# Patient Record
Sex: FEMALE | Race: WHITE | NOT HISPANIC OR LATINO | Employment: FULL TIME | URBAN - METROPOLITAN AREA
[De-identification: names, ages, dates, MRNs, and addresses within clinical notes are randomized per-mention and may not be internally consistent; named-entity substitution may affect disease eponyms.]

---

## 2019-10-08 ENCOUNTER — LAB (OUTPATIENT)
Dept: FAMILY MEDICINE CLINIC | Facility: CLINIC | Age: 49
End: 2019-10-08

## 2019-10-08 ENCOUNTER — TELEPHONE (OUTPATIENT)
Dept: FAMILY MEDICINE CLINIC | Facility: CLINIC | Age: 49
End: 2019-10-08

## 2019-10-08 ENCOUNTER — OFFICE VISIT (OUTPATIENT)
Dept: FAMILY MEDICINE CLINIC | Facility: CLINIC | Age: 49
End: 2019-10-08

## 2019-10-08 VITALS
WEIGHT: 267 LBS | HEIGHT: 65 IN | DIASTOLIC BLOOD PRESSURE: 86 MMHG | OXYGEN SATURATION: 98 % | BODY MASS INDEX: 44.48 KG/M2 | HEART RATE: 71 BPM | SYSTOLIC BLOOD PRESSURE: 142 MMHG | RESPIRATION RATE: 12 BRPM | TEMPERATURE: 98.2 F

## 2019-10-08 DIAGNOSIS — Z12.39 SCREENING FOR BREAST CANCER: ICD-10-CM

## 2019-10-08 DIAGNOSIS — Z00.00 PREVENTATIVE HEALTH CARE: ICD-10-CM

## 2019-10-08 DIAGNOSIS — E55.9 VITAMIN D DEFICIENCY: ICD-10-CM

## 2019-10-08 DIAGNOSIS — E66.01 CLASS 3 SEVERE OBESITY WITH BODY MASS INDEX (BMI) OF 40.0 TO 44.9 IN ADULT, UNSPECIFIED OBESITY TYPE, UNSPECIFIED WHETHER SERIOUS COMORBIDITY PRESENT (HCC): ICD-10-CM

## 2019-10-08 DIAGNOSIS — Z00.00 PREVENTATIVE HEALTH CARE: Primary | ICD-10-CM

## 2019-10-08 DIAGNOSIS — R35.0 URINARY FREQUENCY: ICD-10-CM

## 2019-10-08 LAB
ALBUMIN SERPL-MCNC: 3.5 G/DL (ref 3.5–4.8)
ALBUMIN/GLOB SERPL: 1 G/DL (ref 1–1.7)
ALP SERPL-CCNC: 54 U/L (ref 32–91)
ALT SERPL W P-5'-P-CCNC: 30 U/L (ref 14–54)
ANION GAP SERPL CALCULATED.3IONS-SCNC: 11.9 MMOL/L (ref 5–15)
ARTICHOKE IGE QN: 126 MG/DL (ref 0–100)
AST SERPL-CCNC: 25 U/L (ref 15–41)
BACTERIA UR QL AUTO: ABNORMAL /HPF
BASOPHILS # BLD AUTO: 0.1 10*3/MM3 (ref 0–0.2)
BASOPHILS NFR BLD AUTO: 1 % (ref 0–1.5)
BILIRUB SERPL-MCNC: 0.8 MG/DL (ref 0.3–1.2)
BILIRUB UR QL STRIP: NEGATIVE
BUN BLD-MCNC: 14 MG/DL (ref 8–20)
BUN/CREAT SERPL: 17.5 (ref 5.4–26.2)
CALCIUM SPEC-SCNC: 8.7 MG/DL (ref 8.9–10.3)
CHLORIDE SERPL-SCNC: 106 MMOL/L (ref 101–111)
CHOLEST SERPL-MCNC: 165 MG/DL
CLARITY UR: ABNORMAL
CO2 SERPL-SCNC: 27 MMOL/L (ref 22–32)
COLOR UR: YELLOW
CREAT BLD-MCNC: 0.8 MG/DL (ref 0.4–1)
DEPRECATED RDW RBC AUTO: 43.3 FL (ref 37–54)
EOSINOPHIL # BLD AUTO: 0.2 10*3/MM3 (ref 0–0.4)
EOSINOPHIL NFR BLD AUTO: 2.2 % (ref 0.3–6.2)
ERYTHROCYTE [DISTWIDTH] IN BLOOD BY AUTOMATED COUNT: 14.4 % (ref 12.3–15.4)
GFR SERPL CREATININE-BSD FRML MDRD: 77 ML/MIN/1.73
GLOBULIN UR ELPH-MCNC: 3.5 GM/DL (ref 2.5–3.8)
GLUCOSE BLD-MCNC: 87 MG/DL (ref 65–99)
GLUCOSE UR STRIP-MCNC: NEGATIVE MG/DL
HCT VFR BLD AUTO: 36.1 % (ref 34–46.6)
HDLC SERPL QL: 3.93
HDLC SERPL-MCNC: 42 MG/DL
HGB BLD-MCNC: 12.3 G/DL (ref 12–15.9)
HGB UR QL STRIP.AUTO: ABNORMAL
HYALINE CASTS UR QL AUTO: ABNORMAL /LPF
KETONES UR QL STRIP: NEGATIVE
LDLC/HDLC SERPL: 2.64 {RATIO}
LEUKOCYTE ESTERASE UR QL STRIP.AUTO: ABNORMAL
LYMPHOCYTES # BLD AUTO: 2.1 10*3/MM3 (ref 0.7–3.1)
LYMPHOCYTES NFR BLD AUTO: 28.2 % (ref 19.6–45.3)
MCH RBC QN AUTO: 29 PG (ref 26.6–33)
MCHC RBC AUTO-ENTMCNC: 34 G/DL (ref 31.5–35.7)
MCV RBC AUTO: 85.4 FL (ref 79–97)
MONOCYTES # BLD AUTO: 0.6 10*3/MM3 (ref 0.1–0.9)
MONOCYTES NFR BLD AUTO: 7.8 % (ref 5–12)
NEUTROPHILS # BLD AUTO: 4.5 10*3/MM3 (ref 1.7–7)
NEUTROPHILS NFR BLD AUTO: 60.8 % (ref 42.7–76)
NITRITE UR QL STRIP: NEGATIVE
NRBC BLD AUTO-RTO: 0.2 /100 WBC (ref 0–0.2)
PH UR STRIP.AUTO: 7.5 [PH] (ref 5–8)
PLAT MORPH BLD: NORMAL
PLATELET # BLD AUTO: 369 10*3/MM3 (ref 140–450)
PMV BLD AUTO: 8.3 FL (ref 6–12)
POTASSIUM BLD-SCNC: 3.9 MMOL/L (ref 3.6–5.1)
PROT SERPL-MCNC: 7 G/DL (ref 6.1–7.9)
PROT UR QL STRIP: NEGATIVE
RBC # BLD AUTO: 4.23 10*6/MM3 (ref 3.77–5.28)
RBC # UR: ABNORMAL /HPF
RBC MORPH BLD: NORMAL
REF LAB TEST METHOD: ABNORMAL
SODIUM BLD-SCNC: 141 MMOL/L (ref 136–144)
SP GR UR STRIP: 1.02 (ref 1–1.03)
SQUAMOUS #/AREA URNS HPF: ABNORMAL /HPF
TRIGL SERPL-MCNC: 61 MG/DL
TSH SERPL DL<=0.05 MIU/L-ACNC: 1.06 UIU/ML (ref 0.34–5.6)
UROBILINOGEN UR QL STRIP: ABNORMAL
VLDLC SERPL-MCNC: 12.2 MG/DL
WBC MORPH BLD: NORMAL
WBC NRBC COR # BLD: 7.3 10*3/MM3 (ref 3.4–10.8)
WBC UR QL AUTO: ABNORMAL /HPF

## 2019-10-08 PROCEDURE — 80061 LIPID PANEL: CPT | Performed by: INTERNAL MEDICINE

## 2019-10-08 PROCEDURE — 87086 URINE CULTURE/COLONY COUNT: CPT | Performed by: INTERNAL MEDICINE

## 2019-10-08 PROCEDURE — 84443 ASSAY THYROID STIM HORMONE: CPT | Performed by: INTERNAL MEDICINE

## 2019-10-08 PROCEDURE — 80053 COMPREHEN METABOLIC PANEL: CPT | Performed by: INTERNAL MEDICINE

## 2019-10-08 PROCEDURE — 83036 HEMOGLOBIN GLYCOSYLATED A1C: CPT | Performed by: INTERNAL MEDICINE

## 2019-10-08 PROCEDURE — 36415 COLL VENOUS BLD VENIPUNCTURE: CPT

## 2019-10-08 PROCEDURE — 99386 PREV VISIT NEW AGE 40-64: CPT | Performed by: INTERNAL MEDICINE

## 2019-10-08 PROCEDURE — 85007 BL SMEAR W/DIFF WBC COUNT: CPT | Performed by: INTERNAL MEDICINE

## 2019-10-08 PROCEDURE — 81001 URINALYSIS AUTO W/SCOPE: CPT | Performed by: INTERNAL MEDICINE

## 2019-10-08 PROCEDURE — 85025 COMPLETE CBC W/AUTO DIFF WBC: CPT | Performed by: INTERNAL MEDICINE

## 2019-10-08 PROCEDURE — 82306 VITAMIN D 25 HYDROXY: CPT | Performed by: INTERNAL MEDICINE

## 2019-10-08 RX ORDER — NAPROXEN SODIUM 220 MG
220 TABLET ORAL 2 TIMES DAILY PRN
COMMUNITY
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
## 2019-10-08 NOTE — TELEPHONE ENCOUNTER
Please write letter addressed to [facility] bariatric surgery program in support of pt receiving gastric bypass, having failed conservative measures. Pt had actually gone through the program before, but surgery was a plan exclusion. Now that pt has changed insurances, is ready to undergo surgery again.    Please email letter to:  [email or web address]

## 2019-10-09 LAB
25(OH)D3 SERPL-MCNC: 29.4 NG/ML (ref 30–100)
BACTERIA SPEC AEROBE CULT: NORMAL
HBA1C MFR BLD: 5.5 % (ref 3.5–5.6)

## 2019-10-31 ENCOUNTER — APPOINTMENT (OUTPATIENT)
Dept: MAMMOGRAPHY | Facility: HOSPITAL | Age: 49
End: 2019-10-31

## 2019-11-05 ENCOUNTER — TELEPHONE (OUTPATIENT)
Dept: FAMILY MEDICINE CLINIC | Facility: CLINIC | Age: 49
End: 2019-11-05

## 2019-11-05 DIAGNOSIS — R53.83 FATIGUE, UNSPECIFIED TYPE: Primary | ICD-10-CM

## 2019-11-05 NOTE — TELEPHONE ENCOUNTER
We're not allowed to order sleep studies directly anymore - have to refer to sleep medicine    cruzito Torres to put in referral to sleep med

## 2019-11-05 NOTE — TELEPHONE ENCOUNTER
Patient left  stating that she went to [facility] initial assessment today. Patient states that she was told by Dr. Li that she needs to have a sleep study done, and to contact PCP for order. Patient is asking if this can be set up.

## 2019-11-06 ENCOUNTER — HOSPITAL ENCOUNTER (OUTPATIENT)
Dept: MAMMOGRAPHY | Facility: HOSPITAL | Age: 49
Discharge: HOME | End: 2019-11-06
Admitting: INTERNAL MEDICINE

## 2019-11-06 DIAGNOSIS — Z12.39 SCREENING FOR BREAST CANCER: ICD-10-CM

## 2019-11-06 PROCEDURE — 77067 SCR MAMMO BI INCL CAD: CPT

## 2019-11-06 PROCEDURE — 77063 BREAST TOMOSYNTHESIS BI: CPT

## 2019-11-18 ENCOUNTER — TELEPHONE (OUTPATIENT)
Dept: FAMILY MEDICINE CLINIC | Facility: CLINIC | Age: 49
End: 2019-11-18

## 2019-11-18 NOTE — TELEPHONE ENCOUNTER
Patient called checking on status of paperwork that was dropped off last week. Patient states that paperwork has to be faxed to [facility] weight management by 11/26. Patient states that she has to get a scope done.

## 2020-02-20 ENCOUNTER — TELEPHONE (OUTPATIENT)
Dept: FAMILY MEDICINE CLINIC | Facility: CLINIC | Age: 50
End: 2020-02-20

## 2020-02-20 NOTE — TELEPHONE ENCOUNTER
Spoke with patient. She had her follow up with surgeon already and states she is sore but healing fine. Supposed to return to work on 3/9/20 and see him again in April.

## 2024-07-09 ENCOUNTER — OFFICE (AMBULATORY)
Dept: URBAN - METROPOLITAN AREA PATHOLOGY 19 | Facility: PATHOLOGY | Age: 54
End: 2024-07-09
Payer: COMMERCIAL

## 2024-07-09 ENCOUNTER — ON CAMPUS - OUTPATIENT (AMBULATORY)
Dept: URBAN - METROPOLITAN AREA HOSPITAL 2 | Facility: HOSPITAL | Age: 54
End: 2024-07-09
Payer: COMMERCIAL

## 2024-07-09 VITALS
RESPIRATION RATE: 18 BRPM | DIASTOLIC BLOOD PRESSURE: 83 MMHG | HEART RATE: 82 BPM | SYSTOLIC BLOOD PRESSURE: 124 MMHG | SYSTOLIC BLOOD PRESSURE: 116 MMHG | OXYGEN SATURATION: 98 % | DIASTOLIC BLOOD PRESSURE: 91 MMHG | HEART RATE: 85 BPM | DIASTOLIC BLOOD PRESSURE: 67 MMHG | SYSTOLIC BLOOD PRESSURE: 125 MMHG | DIASTOLIC BLOOD PRESSURE: 77 MMHG | OXYGEN SATURATION: 100 % | DIASTOLIC BLOOD PRESSURE: 84 MMHG | SYSTOLIC BLOOD PRESSURE: 117 MMHG | HEART RATE: 67 BPM | SYSTOLIC BLOOD PRESSURE: 129 MMHG | RESPIRATION RATE: 14 BRPM | DIASTOLIC BLOOD PRESSURE: 78 MMHG | DIASTOLIC BLOOD PRESSURE: 92 MMHG | HEART RATE: 74 BPM | SYSTOLIC BLOOD PRESSURE: 157 MMHG | HEIGHT: 65 IN | SYSTOLIC BLOOD PRESSURE: 155 MMHG | SYSTOLIC BLOOD PRESSURE: 138 MMHG | HEART RATE: 81 BPM | WEIGHT: 247.2 LBS | RESPIRATION RATE: 12 BRPM | DIASTOLIC BLOOD PRESSURE: 93 MMHG | TEMPERATURE: 97.8 F | HEART RATE: 76 BPM | RESPIRATION RATE: 16 BRPM | HEART RATE: 96 BPM

## 2024-07-09 DIAGNOSIS — K57.30 DIVERTICULOSIS OF LARGE INTESTINE WITHOUT PERFORATION OR ABS: ICD-10-CM

## 2024-07-09 DIAGNOSIS — Z12.11 ENCOUNTER FOR SCREENING FOR MALIGNANT NEOPLASM OF COLON: ICD-10-CM

## 2024-07-09 DIAGNOSIS — K63.5 POLYP OF COLON: ICD-10-CM

## 2024-07-09 LAB
GI HISTOLOGY: A. SIGMOID COLON: (no result)
GI HISTOLOGY: PDF REPORT: (no result)

## 2024-07-09 PROCEDURE — 88305 TISSUE EXAM BY PATHOLOGIST: CPT | Performed by: PATHOLOGY

## 2024-07-09 PROCEDURE — 45385 COLONOSCOPY W/LESION REMOVAL: CPT | Mod: 33 | Performed by: INTERNAL MEDICINE
